# Patient Record
Sex: FEMALE | Race: WHITE | NOT HISPANIC OR LATINO
[De-identification: names, ages, dates, MRNs, and addresses within clinical notes are randomized per-mention and may not be internally consistent; named-entity substitution may affect disease eponyms.]

---

## 2018-07-13 ENCOUNTER — RESULT CHARGE (OUTPATIENT)
Age: 68
End: 2018-07-13

## 2018-07-13 ENCOUNTER — OTHER (OUTPATIENT)
Age: 68
End: 2018-07-13

## 2018-07-13 ENCOUNTER — APPOINTMENT (OUTPATIENT)
Dept: HEART AND VASCULAR | Facility: CLINIC | Age: 68
End: 2018-07-13
Payer: COMMERCIAL

## 2018-07-13 ENCOUNTER — APPOINTMENT (OUTPATIENT)
Dept: HEART AND VASCULAR | Facility: CLINIC | Age: 68
End: 2018-07-13

## 2018-07-13 ENCOUNTER — RECORD ABSTRACTING (OUTPATIENT)
Age: 68
End: 2018-07-13

## 2018-07-13 VITALS — DIASTOLIC BLOOD PRESSURE: 80 MMHG | HEART RATE: 78 BPM | SYSTOLIC BLOOD PRESSURE: 127 MMHG

## 2018-07-13 DIAGNOSIS — Z82.49 FAMILY HISTORY OF ISCHEMIC HEART DISEASE AND OTHER DISEASES OF THE CIRCULATORY SYSTEM: ICD-10-CM

## 2018-07-13 DIAGNOSIS — Z87.891 PERSONAL HISTORY OF NICOTINE DEPENDENCE: ICD-10-CM

## 2018-07-13 DIAGNOSIS — Z86.73 PERSONAL HISTORY OF TRANSIENT ISCHEMIC ATTACK (TIA), AND CEREBRAL INFARCTION W/OUT RESIDUAL DEFICITS: ICD-10-CM

## 2018-07-13 DIAGNOSIS — Z86.79 PERSONAL HISTORY OF OTHER DISEASES OF THE CIRCULATORY SYSTEM: ICD-10-CM

## 2018-07-13 PROCEDURE — 99213 OFFICE O/P EST LOW 20 MIN: CPT | Mod: 25

## 2018-07-13 PROCEDURE — 93880 EXTRACRANIAL BILAT STUDY: CPT

## 2018-07-13 PROCEDURE — 93000 ELECTROCARDIOGRAM COMPLETE: CPT

## 2018-07-13 PROCEDURE — 93306 TTE W/DOPPLER COMPLETE: CPT

## 2018-07-13 RX ORDER — FLUTICASONE PROPIONATE 50 MCG
50 SPRAY, SUSPENSION NASAL
Refills: 0 | Status: ACTIVE | COMMUNITY

## 2018-07-13 RX ORDER — FOLIC ACID 1 MG/1
1 TABLET ORAL
Refills: 0 | Status: ACTIVE | COMMUNITY

## 2018-07-13 RX ORDER — ASPIRIN 81 MG
81 TABLET, DELAYED RELEASE (ENTERIC COATED) ORAL
Refills: 0 | Status: ACTIVE | COMMUNITY

## 2022-10-04 ENCOUNTER — NON-APPOINTMENT (OUTPATIENT)
Age: 72
End: 2022-10-04

## 2022-10-04 ENCOUNTER — APPOINTMENT (OUTPATIENT)
Dept: HEART AND VASCULAR | Facility: CLINIC | Age: 72
End: 2022-10-04

## 2022-10-04 VITALS
HEART RATE: 48 BPM | WEIGHT: 180 LBS | DIASTOLIC BLOOD PRESSURE: 80 MMHG | SYSTOLIC BLOOD PRESSURE: 165 MMHG | HEIGHT: 64 IN | RESPIRATION RATE: 16 BRPM | BODY MASS INDEX: 30.73 KG/M2

## 2022-10-04 PROCEDURE — ZZZZZ: CPT

## 2022-10-04 PROCEDURE — 93880 EXTRACRANIAL BILAT STUDY: CPT

## 2022-10-04 PROCEDURE — 99204 OFFICE O/P NEW MOD 45 MIN: CPT | Mod: 25

## 2022-10-04 PROCEDURE — 93306 TTE W/DOPPLER COMPLETE: CPT

## 2022-10-04 PROCEDURE — 93000 ELECTROCARDIOGRAM COMPLETE: CPT

## 2022-10-04 RX ORDER — ASPIRIN 81 MG
81 TABLET, DELAYED RELEASE (ENTERIC COATED) ORAL DAILY
Refills: 0 | Status: ACTIVE | COMMUNITY

## 2022-10-04 RX ORDER — ALPRAZOLAM 0.25 MG/1
0.25 TABLET ORAL
Refills: 0 | Status: ACTIVE | COMMUNITY

## 2022-10-04 RX ORDER — LOSARTAN POTASSIUM 100 MG/1
100 TABLET, FILM COATED ORAL
Refills: 0 | Status: DISCONTINUED | COMMUNITY
End: 2022-10-04

## 2022-10-04 RX ORDER — SERTRALINE HYDROCHLORIDE 50 MG/1
50 TABLET, FILM COATED ORAL DAILY
Refills: 0 | Status: ACTIVE | COMMUNITY

## 2022-10-04 NOTE — HISTORY OF PRESENT ILLNESS
[FreeTextEntry1] : Patient is a 72-year-old former smoker with a history of hypertension and hyperlipidemia who had been seen some 4 years ago for baseline cardiac evaluation.  She currently is referred by her internist for progressive symptoms of dyspnea with exertion she finds she has trouble keeping up with walking with friends as well as when she walks individually.  Periods of shortness of breath and not associated with any chest pressure.  She has no accompanying diaphoresis nausea or vomiting.  Patient has reasonably good sleep pattern with no PND she has occasional ankle edema which she takes diuretic as needed..

## 2022-10-04 NOTE — DISCUSSION/SUMMARY
[Hypertension] : hypertension [Not Responding to Treatment] : not responding to treatment [Increase] : increasing calcium channel blockers [Dietary Modification] : dietary modification [Home Monitor] : a home monitor [Weight Loss] : weight loss [Patient] : the patient [Heart Failure Diastolic] : diastolic heart failure [Deteriorating] : deteriorating [Outpatient Evaluation] : outpatient evaluation [Echocardiogram] : an echocardiogram [Electrocardiogram] : an ECG [Treadmill Exercise Test] : a treadmill stress test [Continue] : continuing beta blockers [Low Sodium Diet] : low sodium diet [de-identified] : r/o CAD

## 2022-10-04 NOTE — PHYSICAL EXAM
[Well Developed] : well developed [Well Nourished] : well nourished [No Acute Distress] : no acute distress [Normal Conjunctiva] : normal conjunctiva [Normal Venous Pressure] : normal venous pressure [No Carotid Bruit] : no carotid bruit [No Gallop] : no gallop [5th Left ICS - MCL] : palpated at the 5th LICS in the midclavicular line [Normal] : normal [Rhythm Regular] : regular [Normal S1] : normal S1 [Normal S2] : normal S2 [II] : a grade 2 [Right Carotid Bruit] : right carotid bruit heard [Left Carotid Bruit] : left carotid bruit heard [Clear Lung Fields] : clear lung fields [Good Air Entry] : good air entry [No Respiratory Distress] : no respiratory distress  [Soft] : abdomen soft [Non Tender] : non-tender [No Masses/organomegaly] : no masses/organomegaly [Normal Bowel Sounds] : normal bowel sounds [Normal Gait] : normal gait [No Edema] : no edema [No Cyanosis] : no cyanosis [No Clubbing] : no clubbing [No Varicosities] : no varicosities [No Rash] : no rash [No Skin Lesions] : no skin lesions [Moves all extremities] : moves all extremities [No Focal Deficits] : no focal deficits [Normal Speech] : normal speech [Alert and Oriented] : alert and oriented [Normal memory] : normal memory

## 2022-10-04 NOTE — REVIEW OF SYSTEMS
[Feeling Fatigued] : feeling fatigued [SOB] : shortness of breath [Dyspnea on exertion] : dyspnea during exertion [Lower Ext Edema] : lower extremity edema [Negative] : Integumentary [Headache] : no headache [Weight Gain (___ Lbs)] : no recent weight gain [Weight Loss (___ Lbs)] : no recent weight loss [Blurry Vision] : no blurred vision [Seeing Double (Diplopia)] : no diplopia [Chest Discomfort] : no chest discomfort [Palpitations] : no palpitations [Orthopnea] : no orthopnea [PND] : no PND

## 2022-10-04 NOTE — ASSESSMENT
[FreeTextEntry1] : Echo Lvef 55% MOD Diast Dysfx and 3+ MR Post Jet \par Carotid Mod non obstructive plaque (progressive)\par Etiology of dyspnea can be multifactorial \par 1 Diastolic dysfx \par 2 Progressive MR \par 3 Possible ischemic disease \par will increase Norvasc to 10 mg for Bp control \par schedule for nuclear stress imaging \par Consider adding aldactone if bP not well controlled

## 2022-10-12 ENCOUNTER — APPOINTMENT (OUTPATIENT)
Dept: HEART AND VASCULAR | Facility: CLINIC | Age: 72
End: 2022-10-12

## 2022-10-12 DIAGNOSIS — I70.90 UNSPECIFIED ATHEROSCLEROSIS: ICD-10-CM

## 2022-10-12 PROCEDURE — A9500: CPT

## 2022-10-12 PROCEDURE — 93015 CV STRESS TEST SUPVJ I&R: CPT

## 2022-10-12 PROCEDURE — 96374 THER/PROPH/DIAG INJ IV PUSH: CPT | Mod: 59

## 2022-10-12 PROCEDURE — 78452 HT MUSCLE IMAGE SPECT MULT: CPT

## 2022-10-26 ENCOUNTER — APPOINTMENT (OUTPATIENT)
Dept: HEART AND VASCULAR | Facility: CLINIC | Age: 72
End: 2022-10-26

## 2022-10-26 VITALS
HEIGHT: 64 IN | WEIGHT: 180 LBS | BODY MASS INDEX: 30.73 KG/M2 | SYSTOLIC BLOOD PRESSURE: 160 MMHG | RESPIRATION RATE: 16 BRPM | DIASTOLIC BLOOD PRESSURE: 85 MMHG

## 2022-10-26 PROCEDURE — 99214 OFFICE O/P EST MOD 30 MIN: CPT

## 2022-10-27 NOTE — HISTORY OF PRESENT ILLNESS
[FreeTextEntry1] : recent ETT neg for ischemia \par Echo Mod-severe MR w PHtn \par good effort capacity

## 2022-10-27 NOTE — ASSESSMENT
[FreeTextEntry1] : Negative for ischemia on stress test \par Good effort caapacity \par Has Progressive MR on echo compared to 2017\par No indication for intervention now \par need to control BP < 138/80 ,\par regular exercise and wgt loss\par will f/u 6m

## 2022-10-27 NOTE — DISCUSSION/SUMMARY
[FreeTextEntry1] : Negative for ischemia on stress test  Good effort caapacity  Has Progressive MR on echo compared to 2017 No indication for intervention now  need to control BP < 138/80 , regular exercise and wgt loss will f/u 6m

## 2022-10-27 NOTE — REVIEW OF SYSTEMS
[Fever] : no fever [Chills] : no chills [SOB] : shortness of breath [Leg Claudication] : no intermittent leg claudication [Syncope] : no syncope [Negative] : Gastrointestinal

## 2022-11-09 PROBLEM — I70.90 UNSPECIFIED ATHEROSCLEROSIS: Status: ACTIVE | Noted: 2018-07-13

## 2023-04-26 ENCOUNTER — APPOINTMENT (OUTPATIENT)
Dept: HEART AND VASCULAR | Facility: CLINIC | Age: 73
End: 2023-04-26

## 2023-09-01 ENCOUNTER — APPOINTMENT (OUTPATIENT)
Dept: HEART AND VASCULAR | Facility: CLINIC | Age: 73
End: 2023-09-01
Payer: COMMERCIAL

## 2023-09-01 ENCOUNTER — NON-APPOINTMENT (OUTPATIENT)
Age: 73
End: 2023-09-01

## 2023-09-01 VITALS — DIASTOLIC BLOOD PRESSURE: 88 MMHG | SYSTOLIC BLOOD PRESSURE: 148 MMHG

## 2023-09-01 VITALS
WEIGHT: 184 LBS | DIASTOLIC BLOOD PRESSURE: 80 MMHG | HEART RATE: 50 BPM | SYSTOLIC BLOOD PRESSURE: 142 MMHG | HEIGHT: 64 IN | BODY MASS INDEX: 31.41 KG/M2

## 2023-09-01 PROCEDURE — 99214 OFFICE O/P EST MOD 30 MIN: CPT | Mod: 25

## 2023-09-01 PROCEDURE — 93000 ELECTROCARDIOGRAM COMPLETE: CPT

## 2023-09-01 NOTE — PHYSICAL EXAM
[Well Developed] : well developed [Well Nourished] : well nourished [No Acute Distress] : no acute distress [Normal Conjunctiva] : normal conjunctiva [Normal Venous Pressure] : normal venous pressure [No Carotid Bruit] : no carotid bruit [Normal S1, S2] : normal S1, S2 [No Murmur] : no murmur [No Rub] : no rub [No Gallop] : no gallop [Clear Lung Fields] : clear lung fields [Good Air Entry] : good air entry [No Respiratory Distress] : no respiratory distress  [Soft] : abdomen soft [Non Tender] : non-tender [No Masses/organomegaly] : no masses/organomegaly [Normal Bowel Sounds] : normal bowel sounds

## 2023-09-01 NOTE — HISTORY OF PRESENT ILLNESS
[FreeTextEntry1] : 10/26/22 recent ETT neg for ischemia Echo Mod-severe MR w PHtn good effort capacity 09/01/23 had swelling in LE with norvaswc 10 mg  was educed to Norvasc 5mg and 2.5 mg prn elevated BP  edema resolved  feels well no sscp or [facility]os

## 2023-09-01 NOTE — ASSESSMENT
[FreeTextEntry1] : Negative for ischemia on stress test Good effort capacity Has Progressive MR on echo compared to 2017 No indication for intervention now need to control BP < 138/80 , regular exercise and wgt loss will f/u 6m.

## 2023-09-01 NOTE — DISCUSSION/SUMMARY
[FreeTextEntry1] : Negative for ischemia on stress test Good effort capacity Has Progressive MR on echo compared to 2017 f/u echo in oct 2023  No indication for intervention now need to control BP < 138/80 , regular exercise and wgt loss will f/u 6m.

## 2023-09-01 NOTE — REVIEW OF SYSTEMS
[SOB] : shortness of breath [Negative] : Gastrointestinal [Fever] : no fever [Chills] : no chills [Leg Claudication] : no intermittent leg claudication [Syncope] : no syncope

## 2023-09-01 NOTE — ADDENDUM
[FreeTextEntry1] : I, Armando Blankenship, assisted in documentation on 09/01/2023 acting as a scribe for Dr. Emmett Matt.

## 2023-10-26 ENCOUNTER — NON-APPOINTMENT (OUTPATIENT)
Age: 73
End: 2023-10-26

## 2023-10-27 ENCOUNTER — APPOINTMENT (OUTPATIENT)
Dept: HEART AND VASCULAR | Facility: CLINIC | Age: 73
End: 2023-10-27
Payer: COMMERCIAL

## 2023-10-27 ENCOUNTER — NON-APPOINTMENT (OUTPATIENT)
Age: 73
End: 2023-10-27

## 2023-10-27 VITALS
BODY MASS INDEX: 31.41 KG/M2 | HEART RATE: 51 BPM | SYSTOLIC BLOOD PRESSURE: 140 MMHG | DIASTOLIC BLOOD PRESSURE: 90 MMHG | WEIGHT: 184 LBS | HEIGHT: 64 IN

## 2023-10-27 DIAGNOSIS — I67.89 OTHER CEREBROVASCULAR DISEASE: ICD-10-CM

## 2023-10-27 PROCEDURE — 93880 EXTRACRANIAL BILAT STUDY: CPT

## 2023-10-27 PROCEDURE — ZZZZZ: CPT

## 2023-10-27 PROCEDURE — 99214 OFFICE O/P EST MOD 30 MIN: CPT | Mod: 25

## 2023-10-27 PROCEDURE — 93000 ELECTROCARDIOGRAM COMPLETE: CPT

## 2023-10-27 PROCEDURE — 93306 TTE W/DOPPLER COMPLETE: CPT

## 2023-10-27 RX ORDER — SERTRALINE 25 MG/1
25 TABLET, FILM COATED ORAL DAILY
Refills: 0 | Status: ACTIVE | COMMUNITY

## 2023-10-27 RX ORDER — AMLODIPINE BESYLATE 2.5 MG/1
2.5 TABLET ORAL
Qty: 90 | Refills: 3 | Status: DISCONTINUED | COMMUNITY
Start: 2023-09-01 | End: 2023-10-27

## 2023-10-29 ENCOUNTER — NON-APPOINTMENT (OUTPATIENT)
Age: 73
End: 2023-10-29

## 2023-12-12 ENCOUNTER — NON-APPOINTMENT (OUTPATIENT)
Age: 73
End: 2023-12-12

## 2024-04-19 ENCOUNTER — APPOINTMENT (OUTPATIENT)
Dept: HEART AND VASCULAR | Facility: CLINIC | Age: 74
End: 2024-04-19
Payer: COMMERCIAL

## 2024-04-19 VITALS
DIASTOLIC BLOOD PRESSURE: 78 MMHG | HEIGHT: 64 IN | WEIGHT: 184 LBS | HEART RATE: 51 BPM | SYSTOLIC BLOOD PRESSURE: 140 MMHG | BODY MASS INDEX: 31.41 KG/M2

## 2024-04-19 DIAGNOSIS — I34.0 NONRHEUMATIC MITRAL (VALVE) INSUFFICIENCY: ICD-10-CM

## 2024-04-19 DIAGNOSIS — I10 ESSENTIAL (PRIMARY) HYPERTENSION: ICD-10-CM

## 2024-04-19 DIAGNOSIS — I87.2 VENOUS INSUFFICIENCY (CHRONIC) (PERIPHERAL): ICD-10-CM

## 2024-04-19 DIAGNOSIS — Z00.00 ENCOUNTER FOR GENERAL ADULT MEDICAL EXAMINATION W/OUT ABNORMAL FINDINGS: ICD-10-CM

## 2024-04-19 PROCEDURE — 93000 ELECTROCARDIOGRAM COMPLETE: CPT

## 2024-04-19 PROCEDURE — G2211 COMPLEX E/M VISIT ADD ON: CPT

## 2024-04-19 PROCEDURE — 99214 OFFICE O/P EST MOD 30 MIN: CPT

## 2024-04-19 RX ORDER — FUROSEMIDE 20 MG/1
20 TABLET ORAL
Refills: 0 | Status: ACTIVE | COMMUNITY

## 2024-04-19 RX ORDER — LOSARTAN POTASSIUM AND HYDROCHLOROTHIAZIDE 12.5; 1 MG/1; MG/1
100-12.5 TABLET ORAL DAILY
Refills: 0 | Status: ACTIVE | COMMUNITY

## 2024-04-19 RX ORDER — ROSUVASTATIN CALCIUM 20 MG/1
20 TABLET, FILM COATED ORAL
Refills: 0 | Status: ACTIVE | COMMUNITY

## 2024-04-19 RX ORDER — AMLODIPINE BESYLATE 5 MG/1
5 TABLET ORAL DAILY
Qty: 30 | Refills: 5 | Status: ACTIVE | COMMUNITY

## 2024-04-19 RX ORDER — ATENOLOL 100 MG/1
100 TABLET ORAL
Refills: 0 | Status: ACTIVE | COMMUNITY

## 2024-05-01 ENCOUNTER — APPOINTMENT (OUTPATIENT)
Dept: HEART AND VASCULAR | Facility: CLINIC | Age: 74
End: 2024-05-01
Payer: COMMERCIAL

## 2024-05-01 PROCEDURE — 93970 EXTREMITY STUDY: CPT

## 2024-11-08 ENCOUNTER — NON-APPOINTMENT (OUTPATIENT)
Age: 74
End: 2024-11-08

## 2024-11-08 ENCOUNTER — APPOINTMENT (OUTPATIENT)
Dept: HEART AND VASCULAR | Facility: CLINIC | Age: 74
End: 2024-11-08
Payer: MEDICARE

## 2024-11-08 VITALS
DIASTOLIC BLOOD PRESSURE: 84 MMHG | HEIGHT: 64 IN | WEIGHT: 188 LBS | SYSTOLIC BLOOD PRESSURE: 144 MMHG | HEART RATE: 47 BPM | BODY MASS INDEX: 32.1 KG/M2

## 2024-11-08 DIAGNOSIS — Z00.00 ENCOUNTER FOR GENERAL ADULT MEDICAL EXAMINATION W/OUT ABNORMAL FINDINGS: ICD-10-CM

## 2024-11-08 DIAGNOSIS — I34.0 NONRHEUMATIC MITRAL (VALVE) INSUFFICIENCY: ICD-10-CM

## 2024-11-08 PROCEDURE — 93306 TTE W/DOPPLER COMPLETE: CPT

## 2024-11-08 PROCEDURE — 93000 ELECTROCARDIOGRAM COMPLETE: CPT

## 2024-11-08 PROCEDURE — G2211 COMPLEX E/M VISIT ADD ON: CPT

## 2024-11-08 PROCEDURE — 99215 OFFICE O/P EST HI 40 MIN: CPT

## 2024-11-08 NOTE — DISCUSSION/SUMMARY
[Moderate Mitral Regurgitation] : moderate mitral regurgitation [Compensated] : compensated [Echocardiogram] : echocardiogram [Continue] : continuing calcium channel blockers [FreeTextEntry1] : Progressive MR on echo w dilated LA and PULM HTN  will refer for [facility] ? clip vs MV repair  options and risks discussed  [EKG obtained to assist in diagnosis and management of assessed problem(s)] : EKG obtained to assist in diagnosis and management of assessed problem(s)

## 2024-11-08 NOTE — ADDENDUM
[FreeTextEntry1] : Ronnie Garcia assisted in documentation on Nov 8 2024 acting as a scribe for Dr. Mauro Hercules.

## 2024-11-08 NOTE — ASSESSMENT
[FreeTextEntry1] : Lvef 55 3-4 mr DILATED al AND PULM HTN  Refer to [facility] for clip procedure   Lifestyle changes for control of BP reviewed ie wgt reduction, salt restriction, Etoh avoidance, exercise 30 min aerobic activity per day, avoid NSAID use self monitor BP TIW Lifestyle advice for LDL reduction including reduction of red meat consumption concentrating on a plant based diet. 30 min aerobic exercise per day. Calorie reduction to target BMI<25

## 2024-11-08 NOTE — HISTORY OF PRESENT ILLNESS
[FreeTextEntry1] : 10/26/22 recent ETT neg for ischemia Echo Mod-severe MR w PHtn good effort capacity 09/01/23 had swelling in LE with norvaswc 10 mg  was educed to Norvasc 5mg and 2.5 mg prn elevated BP  edema resolved  feels well no sscp or [place]  10/27/23  Has 3+ MR on echo  leg edema usually when standing uses lasis prn  effort capacity unchanged  compliant w BP meds  4/19/24 prior MR tolerating stairs   11/08/24 seen here for HTN and MR moderate stress taking care of 89 yo mother

## 2024-11-08 NOTE — PHYSICAL EXAM
[Well Developed] : well developed [Well Nourished] : well nourished [No Acute Distress] : no acute distress [Normal Conjunctiva] : normal conjunctiva [Normal Venous Pressure] : normal venous pressure [No Carotid Bruit] : no carotid bruit [Carotid Bruit] : carotid bruit [Normal] : normal [Normal S1] : normal S1 [Normal S2] : normal S2 [III] : a grade 3 [___ +] : bilateral [unfilled]U+ pitting edema to the ankles [Clear Lung Fields] : clear lung fields [Good Air Entry] : good air entry [No Respiratory Distress] : no respiratory distress  [Soft] : abdomen soft [Non Tender] : non-tender [No Masses/organomegaly] : no masses/organomegaly [Normal Bowel Sounds] : normal bowel sounds

## 2024-12-16 ENCOUNTER — NON-APPOINTMENT (OUTPATIENT)
Age: 74
End: 2024-12-16

## 2024-12-16 ENCOUNTER — APPOINTMENT (OUTPATIENT)
Dept: CT IMAGING | Facility: HOSPITAL | Age: 74
End: 2024-12-16

## 2024-12-16 ENCOUNTER — APPOINTMENT (OUTPATIENT)
Dept: CARDIOTHORACIC SURGERY | Facility: CLINIC | Age: 74
End: 2024-12-16
Payer: MEDICARE

## 2024-12-16 ENCOUNTER — RESULT REVIEW (OUTPATIENT)
Age: 74
End: 2024-12-16

## 2024-12-16 ENCOUNTER — OUTPATIENT (OUTPATIENT)
Dept: OUTPATIENT SERVICES | Facility: HOSPITAL | Age: 74
LOS: 1 days | End: 2024-12-16
Payer: MEDICARE

## 2024-12-16 VITALS
TEMPERATURE: 97.4 F | HEART RATE: 51 BPM | WEIGHT: 190 LBS | HEIGHT: 64 IN | DIASTOLIC BLOOD PRESSURE: 71 MMHG | OXYGEN SATURATION: 98 % | SYSTOLIC BLOOD PRESSURE: 161 MMHG | BODY MASS INDEX: 32.44 KG/M2

## 2024-12-16 DIAGNOSIS — Z82.49 FAMILY HISTORY OF ISCHEMIC HEART DISEASE AND OTHER DISEASES OF THE CIRCULATORY SYSTEM: ICD-10-CM

## 2024-12-16 DIAGNOSIS — I34.0 NONRHEUMATIC MITRAL (VALVE) INSUFFICIENCY: ICD-10-CM

## 2024-12-16 DIAGNOSIS — Z86.73 PERSONAL HISTORY OF TRANSIENT ISCHEMIC ATTACK (TIA), AND CEREBRAL INFARCTION W/OUT RESIDUAL DEFICITS: ICD-10-CM

## 2024-12-16 DIAGNOSIS — Z63.5 DISRUPTION OF FAMILY BY SEPARATION AND DIVORCE: ICD-10-CM

## 2024-12-16 PROCEDURE — 93312 ECHO TRANSESOPHAGEAL: CPT

## 2024-12-16 PROCEDURE — 86900 BLOOD TYPING SEROLOGIC ABO: CPT

## 2024-12-16 PROCEDURE — 36415 COLL VENOUS BLD VENIPUNCTURE: CPT

## 2024-12-16 PROCEDURE — 85610 PROTHROMBIN TIME: CPT

## 2024-12-16 PROCEDURE — 80053 COMPREHEN METABOLIC PANEL: CPT

## 2024-12-16 PROCEDURE — 93312 ECHO TRANSESOPHAGEAL: CPT | Mod: 26

## 2024-12-16 PROCEDURE — 99204 OFFICE O/P NEW MOD 45 MIN: CPT

## 2024-12-16 PROCEDURE — 85730 THROMBOPLASTIN TIME PARTIAL: CPT

## 2024-12-16 PROCEDURE — 86850 RBC ANTIBODY SCREEN: CPT

## 2024-12-16 PROCEDURE — 83880 ASSAY OF NATRIURETIC PEPTIDE: CPT

## 2024-12-16 PROCEDURE — 85025 COMPLETE CBC W/AUTO DIFF WBC: CPT

## 2024-12-16 PROCEDURE — 86901 BLOOD TYPING SEROLOGIC RH(D): CPT

## 2024-12-16 PROCEDURE — 76377 3D RENDER W/INTRP POSTPROCES: CPT | Mod: 26

## 2024-12-16 RX ORDER — LOSARTAN POTASSIUM 100 MG/1
100 TABLET, FILM COATED ORAL DAILY
Refills: 0 | Status: ACTIVE | COMMUNITY

## 2024-12-16 SDOH — SOCIAL STABILITY - SOCIAL INSECURITY: DISRUPTION OF FAMILY BY SEPARATION AND DIVORCE: Z63.5

## 2024-12-17 NOTE — ASSESSMENT
[FreeTextEntry1] : 74F, former smoker, with PMH of CVA (7/2012, treated at [facility], no residual), psoriasis, OA, renal mass thought to be a normal variant (saw Dr. Christy Mckeon at [facility]), HTN, HLD, and moderate to severe MR referred for consultation of treatment options for MR. Dr. Herrera has independently seen and evaluated the patient in this face-to-face encounter. Patient is clinically stable, NYHA Class II. Echocardiogram showed normal BiV function, 3+MR- atrial functional, 3+TR, normal LV size and function, and moderate PHTN. Per Dr. Goldberg's AARON review, patient with moderate atrial functional MR (final read pending). Results were discussed with patient and her daughter. Indications to treat MR were discussed. Given, patient does not have severe MR, Dr. Herrera recommends close clinical monitoring at this time. Patient advised to reach out to our [facility] should she develop worsening HF symptoms, which will require her to be evaluate sooner rather than later. The patient will otherwise return to [facility] clinic in 6 months with a TTE. All questions were addressed.

## 2024-12-17 NOTE — RESULTS/DATA
[TextEntry] : TTE 11/8/24 CONCLUSIONS: 1. Left ventricular systolic function is normal with an ejection fraction visually estimated at 55 to 60 %. 2. Normal right ventricular cavity size, with normal wall thickness, and normal right ventricular systolic function. 3. Left atrium is severely dilated. 4. Moderate to severe mitral regurgitation. The mitral regurgitant jet is posteriorly directed. 5. There has been progression of Pulmonary hypertension and progression of MITRAL REGURGITATION. 6. Mild left ventricular hypertrophy. 7. Moderate pulmonary hypertension. 8. Moderate tricuspid regurgitation. 9. The inferior vena cava is normal in size (normal <2.1cm) with normal inspiratory collapse (normal >50%) consistent with normal right atrial pressure ( R 3, range 0-5mmHg).  12/16/24 WBC 5.95 H/H 11.9/36.8 Plt 193 BUN/Cr 15/0.79 ProBNP 1101

## 2024-12-17 NOTE — ASSESSMENT
[FreeTextEntry1] : 74F, former smoker, with PMH of CVA (7/2012, treated at [facility], no residual), psoriasis, OA, renal mass thought to be a normal variant (saw Dr. Christy Mckeon at [facility]), HTN, HLD, and moderate to severe MR referred for consultation of treatment options for MR. Dr. Herrera has independently seen and evaluated the patient in this face-to-face encounter. Patient is clinically stable, NYHA Class II. Echocardiogram showed normal BiV function, 3+MR- atrial functional, 3+TR, normal LV size and function, and moderate PHTN. Per Dr. Goldberg's AARON review, patient with moderate atrial functional MR (final read pending). Results were discussed with patient and her daughter. Indications to treat MR were discussed. Given, patient does not have severe MR, Dr. Herrera recommends close clinical monitoring at this time. Patient advised to reach out to our [facility] should she develop worsening HF symptoms, which will require her to be evaluate sooner rather than later. The patient will otherwise return to [facility] clinic in 6 months with a TTE. All questions were addressed.

## 2024-12-17 NOTE — REVIEW OF SYSTEMS
[Joint Pain] : joint pain [Under Stress] : under stress [Negative] : Heme/Lymph [Confusion] : no confusion was observed [FreeTextEntry5] : see HPI

## 2024-12-17 NOTE — PLAN
[TextEntry] : - continue GDMT per Dr. Hercules.  - return to D clinic in 6 months with TTE and OV with Dr. Herrera.   I, Dr. Eleazar Herrera, personally performed the evaluation and management (E/M) services for this new patient. That E/M includes conducting the clinically appropriate initial history &/or exam, assessing all conditions, and establishing the plan of care. Today, my SELAM, noted herewith, was here to observe my evaluation and management service for this patient & follow plan of care established by me going forward.

## 2024-12-17 NOTE — HISTORY OF PRESENT ILLNESS
[FreeTextEntry1] : Referred by Dr. Hercules  74F, former smoker, with PMH of CVA (7/2012, treated at [facility], no residual), psoriasis, OA, renal mass thought to be a normal variant ([facility]), HTN, HLD, and moderate to severe MR referred for consultation of treatment options for MR.   Denies prior MI/CAD, pulm/liver/heme disease. No prior transfusion.   Per Dr. Mckeon's 11/20/24 note:  Renal Mass:  - MRI 12/2022 with kidney with lobular contour which is a normal variant and no mass. Right kidney with simple cysts; although it has grown in size it is   simple and therefore per guidelines does not require further f/u given very low malignancy potential. Possible left renal cyst noted on MRI 12/2022 not seen on   US 11/2024 so would not continue surveillance imaging there.  TTE 11/18/24 CONCLUSIONS: 1. Left ventricular systolic function is normal with an ejection fraction visually estimated at 55 to 60 %. 2. Normal right ventricular cavity size, with normal wall thickness, and normal right ventricular systolic function. 3. Left atrium is severely dilated. 4. Moderate to severe mitral regurgitation. The mitral regurgitant jet is posteriorly directed. 5. There has been progression of Pulmonary hypertension and progression of MITRAL REGURGITATION. 6. Mild left ventricular hypertrophy. 7. Moderate pulmonary hypertension. 8. Moderate tricuspid regurgitation. 9. The inferior vena cava is normal in size (normal <2.1cm) with normal inspiratory collapse (normal >50%) consistent with normal right atrial pressure (R 3, range 0-5mmHg).  EKG 11/8/24: SB rate 47, , QRS 94, QTc 412, normal axis.   Today, patient reports feeling ok. Energy is good. Exercise tolerance of 2 blocks limited by shortness of breath, able to go up and down her two flights of stairs in her house without stopping but get some dyspnea in the process. Occasional LE edema, last were months ago, which was thought to be due to higher dose of amlodipine. LE edema is better since her dose was decreased to 7.5mg from 10mg.  Denies any recent chest pain, palpitations, lightheadedness/dizziness or syncope, orthopnea, PND, abdominal bloating, fever, chills, night sweats, dysuria, active dental issue, or recent hospitalizations. Patient lives alone, retired , independent with ADLs and iADLs.

## 2024-12-17 NOTE — PHYSICAL EXAM
[Well Developed] : well developed [Well Nourished] : well nourished [No Acute Distress] : no acute distress [Normal Conjunctiva] : normal conjunctiva [Normal S1, S2] : normal S1, S2 [No Rub] : no rub [No Gallop] : no gallop [Murmur] : murmur [Clear Lung Fields] : clear lung fields [Good Air Entry] : good air entry [No Respiratory Distress] : no respiratory distress  [Soft] : abdomen soft [Non Tender] : non-tender [Normal Bowel Sounds] : normal bowel sounds [Normal Gait] : normal gait [No Edema] : no edema [No Cyanosis] : no cyanosis [No Clubbing] : no clubbing [No Rash] : no rash [No Skin Lesions] : no skin lesions [de-identified] : supple  [de-identified] : II/VI SM

## 2024-12-17 NOTE — PHYSICAL EXAM
[Well Developed] : well developed [Well Nourished] : well nourished [No Acute Distress] : no acute distress [Normal Conjunctiva] : normal conjunctiva [Normal S1, S2] : normal S1, S2 [No Rub] : no rub [No Gallop] : no gallop [Murmur] : murmur [Clear Lung Fields] : clear lung fields [Good Air Entry] : good air entry [No Respiratory Distress] : no respiratory distress  [Soft] : abdomen soft [Non Tender] : non-tender [Normal Bowel Sounds] : normal bowel sounds [Normal Gait] : normal gait [No Edema] : no edema [No Cyanosis] : no cyanosis [No Clubbing] : no clubbing [No Rash] : no rash [No Skin Lesions] : no skin lesions [de-identified] : supple  [de-identified] : II/VI SM

## 2024-12-17 NOTE — PHYSICAL EXAM
[Well Developed] : well developed [Well Nourished] : well nourished [No Acute Distress] : no acute distress [Normal Conjunctiva] : normal conjunctiva [Normal S1, S2] : normal S1, S2 [No Rub] : no rub [No Gallop] : no gallop [Murmur] : murmur [Clear Lung Fields] : clear lung fields [Good Air Entry] : good air entry [No Respiratory Distress] : no respiratory distress  [Soft] : abdomen soft [Non Tender] : non-tender [Normal Bowel Sounds] : normal bowel sounds [Normal Gait] : normal gait [No Edema] : no edema [No Cyanosis] : no cyanosis [No Clubbing] : no clubbing [No Rash] : no rash [No Skin Lesions] : no skin lesions [de-identified] : supple  [de-identified] : II/VI SM

## 2024-12-17 NOTE — ASSESSMENT
[FreeTextEntry1] : 74F, former smoker, with PMH of CVA (7/2012, treated at [facility], no residual), psoriasis, OA, renal mass thought to be a normal variant (saw Dr. Christy Mckeon at [facility]), HTN, HLD, and moderate to severe MR referred for consultation of treatment options for MR. Dr. Herrera has independently seen and evaluated the patient in this face-to-face encounter. Patient is clinically stable, NYHA Class II. Echocardiogram showed normal BiV function, 3+MR- atrial functional, 3+TR, normal LV size and function, and moderate PHTN. Per Dr. Goldberg's AARON review, patient with moderate atrial functional MR (final read pending). Results were discussed with patient and her daughter. Indications to treat MR were discussed. Given, patient does not have severe MR, Dr. Herrera recommends close clinical monitoring at this time. Patient advised to reach out to our [facility] should she develop worsening HF symptoms, which will require her to be evaluate sooner rather than later. The patient will otherwise return to [facility] clinic in 6 months with a TTE. All questions were addressed.

## 2024-12-17 NOTE — HISTORY OF PRESENT ILLNESS
[FreeTextEntry1] : Referred by Dr. Hercules  74F, former smoker, with PMH of CVA (7/2012, treated at [facility], no residual), psoriasis, OA, renal mass thought to be a normal variant (saw Dr. Christy Mckeon at [facility]), HTN, HLD, and moderate to severe MR referred for consultation of treatment options for MR.   Denies prior MI/CAD, pulm/liver/heme disease. No prior transfusion.   Per Dr. Mckeon's 11/20/24 note:  Renal Mass:  - MRI 12/2022 with kidney with lobular contour which is a normal variant and no mass. Right kidney with simple cysts; although it has grown in size it is   simple and therefore per guidelines does not require further f/u given very low malignancy potential. Possible left renal cyst noted on MRI 12/2022 not seen on   US 11/2024 so would not continue surveillance imaging there.  TTE 11/18/24 CONCLUSIONS: 1. Left ventricular systolic function is normal with an ejection fraction visually estimated at 55 to 60 %. 2. Normal right ventricular cavity size, with normal wall thickness, and normal right ventricular systolic function. 3. Left atrium is severely dilated. 4. Moderate to severe mitral regurgitation. The mitral regurgitant jet is posteriorly directed. 5. There has been progression of Pulmonary hypertension and progression of MITRAL REGURGITATION. 6. Mild left ventricular hypertrophy. 7. Moderate pulmonary hypertension. 8. Moderate tricuspid regurgitation. 9. The inferior vena cava is normal in size (normal <2.1cm) with normal inspiratory collapse (normal >50%) consistent with normal right atrial pressure (R 3, range 0-5mmHg).  EKG 11/8/24: SB rate 47, , QRS 94, QTc 412, normal axis.   Today, patient reports feeling ok. Energy is good. Exercise tolerance of 2 blocks limited by shortness of breath, able to go up and down her two flights of stairs in her house without stopping but get some dyspnea in the process. Occasional LE edema, last were months ago, which was thought to be due to higher dose of amlodipine. LE edema is better since her dose was decreased to 7.5mg from 10mg.  Denies any recent chest pain, palpitations, lightheadedness/dizziness or syncope, orthopnea, PND, abdominal bloating, fever, chills, night sweats, dysuria, active dental issue, or recent hospitalizations. Patient lives alone, retired , independent with ADLs and iADLs.

## 2024-12-17 NOTE — HISTORY OF PRESENT ILLNESS
[FreeTextEntry1] : Referred by Dr. Hercules  74F, former smoker, with PMH of CVA (7/2012, treated at [facility], no residual), psoriasis, OA, renal mass thought to be a normal variant (saw Dr. Christy Mckeon at [facility]), HTN, HLD, and moderate to severe MR referred for consultation of treatment options for MR.   Denies prior MI/CAD, pulm/liver/heme disease. No prior transfusion.   Per Dr. Mckeon's 11/20/24 note:  Renal Mass:  - MRI 12/2022 with kidney with lobular contour which is a normal variant and no mass. Right kidney with simple cysts; although it has grown in size it is   simple and therefore per guidelines does not require further f/u given very low malignancy potential. Possible left renal cyst noted on MRI 12/2022 not seen on   US 11/2024 so would not continue surveillance imaging there.  TTE 11/18/24 CONCLUSIONS: 1. Left ventricular systolic function is normal with an ejection fraction visually estimated at 55 to 60 %. 2. Normal right ventricular cavity size, with normal wall thickness, and normal right ventricular systolic function. 3. Left atrium is severely dilated. 4. Moderate to severe mitral regurgitation. The mitral regurgitant jet is posteriorly directed. 5. There has been progression of Pulmonary hypertension and progression of MITRAL REGURGITATION. 6. Mild left ventricular hypertrophy. 7. Moderate pulmonary hypertension. 8. Moderate tricuspid regurgitation. 9. The inferior vena cava is normal in size (normal <2.1cm) with normal inspiratory collapse (normal >50%) consistent with normal right atrial pressure (R 3, range 0-5mmHg).  EKG 11/8/24: SB rate 47, , QRS 94, QTc 412, normal axis.   Today, patient reports feeling ok. Energy is good. Exercise tolerance of 2 blocks limited by shortness of breath, able to go up and down her two flights of stairs in her house without stopping but get some dyspnea in the process. Occasional LE edema, last were months ago, which was thought to be due to higher dose of amlodipine. LE edema is better since her dose was decreased to 7.5mg from 10mg.  Denies any recent chest pain, palpitations, lightheadedness/dizziness or syncope, orthopnea, PND, abdominal bloating, fever, chills, night sweats, dysuria, active dental issue, or recent hospitalizations. Patient lives alone, retired , independent with ADLs and iADLs.

## 2025-01-03 ENCOUNTER — APPOINTMENT (OUTPATIENT)
Dept: MRI IMAGING | Facility: CLINIC | Age: 75
End: 2025-01-03

## 2025-01-03 ENCOUNTER — APPOINTMENT (OUTPATIENT)
Dept: ORTHOPEDIC SURGERY | Facility: CLINIC | Age: 75
End: 2025-01-03
Payer: MEDICARE

## 2025-01-03 DIAGNOSIS — S52.572A OTHER INTRAARTICULAR FRACTURE OF LOWER END OF LEFT RADIUS, INITIAL ENCOUNTER FOR CLOSED FRACTURE: ICD-10-CM

## 2025-01-03 DIAGNOSIS — S69.92XA UNSPECIFIED INJURY OF LEFT WRIST, HAND AND FINGER(S), INITIAL ENCOUNTER: ICD-10-CM

## 2025-01-03 DIAGNOSIS — S59.912A UNSPECIFIED INJURY OF LEFT FOREARM, INITIAL ENCOUNTER: ICD-10-CM

## 2025-01-03 DIAGNOSIS — S59.909A UNSPECIFIED INJURY OF UNSPECIFIED ELBOW, INITIAL ENCOUNTER: ICD-10-CM

## 2025-01-03 PROCEDURE — 29075 APPL CST ELBW FNGR SHORT ARM: CPT | Mod: LT

## 2025-01-03 PROCEDURE — 99203 OFFICE O/P NEW LOW 30 MIN: CPT | Mod: 25

## 2025-01-03 PROCEDURE — 73090 X-RAY EXAM OF FOREARM: CPT | Mod: LT

## 2025-01-03 PROCEDURE — 73080 X-RAY EXAM OF ELBOW: CPT | Mod: LT

## 2025-01-03 PROCEDURE — 73110 X-RAY EXAM OF WRIST: CPT | Mod: LT,76

## 2025-01-03 PROCEDURE — 73221 MRI JOINT UPR EXTREM W/O DYE: CPT | Mod: LT

## 2025-01-03 NOTE — HISTORY OF PRESENT ILLNESS
[de-identified] : The patient is a 74-year-old female right-hand-dominant here for evaluation of her left elbow, left wrist and left forearm.  She fell 1 day ago while carrying a bag into a home, the bed bumped her leg and she lost her balance and fell, she braced her fall with her left hand.  She immediately developed pain and swelling in the left wrist.  She also reports pain in the left forearm, close to her elbow.  She presents today in a carpal tunnel brace that her daughter put on for her.

## 2025-01-03 NOTE — DISCUSSION/SUMMARY
[de-identified] : I reviewed the x-ray findings with the patient.  Regarding her left elbow she was placed into a sling.  She was advised to remove the sling in 48 hours and start gentle range of motion with flexion, extension, supination and pronation.  I showed her how to do that here in the office today.  She will obtain an MRI of the left elbow to evaluate for an occult fracture since she has a posterior fat pad on exam.  She will call us 3 to 4 days after the MRI is performed so we can discuss the results.  Regarding her left wrist she was placed into a well-fitting, well molded fiberglass short arm cast.  She was advised not to get the cast wet.  Post casting x-rays were obtained here in the office today.  She cannot take oral anti-inflammatory medications so she will use Tylenol for breakthrough pain.  She will follow-up in 2 weeks for further evaluation with Dr. Mcdowell.

## 2025-01-03 NOTE — DATA REVIEWED
[Elbow] : elbow [Left] : left [Wrist] : wrist [FreeTextEntry1] : 3 views of the left elbow were obtained here in the office today and show: No obvious fracture but there is a posterior fat pad. [FreeTextEntry2] : 2 views of the left forearm were obtained here in the office today and show: An intra-articular left distal radius fracture. [FreeTextEntry3] : 3 views of the left wrist were obtained in the office today and show: A minimally displaced intra-articular distal radius fracture

## 2025-01-03 NOTE — IMAGING
[de-identified] : Physical exam of the left elbow: There is mild edema noted over the proximal aspect of the extensor surface of the left forearm.  No edema, erythema or ecchymosis noted over the elbow.  She lacks a few degrees full flexion, full extension, full supination and full pronation.  She has pain with supination and pronation.  No tenderness to palpation over the olecranon.  No tenderness to palpation over the medial or lateral epicondyles.  She has tenderness to palpation over the radial head.  The distal biceps is intact.  Physical exam of the left wrist: There is edema noted dorsally.  There is ecchymosis over the dorsal aspect of the wrist just proximal to the thumb, there is also an area of ecchymosis just proximal to the ulnar styloid.  There is also some ecchymosis over the flexor retinaculum.  Decreased and painful range of motion with extension, flexion, ulnar and radial deviation of the left wrist.  There is tenderness to palpation over the distal radius, tenderness to palpation just proximal to the distal ulna.

## 2025-01-21 ENCOUNTER — APPOINTMENT (OUTPATIENT)
Dept: ORTHOPEDIC SURGERY | Facility: CLINIC | Age: 75
End: 2025-01-21
Payer: MEDICARE

## 2025-01-21 DIAGNOSIS — S69.92XA UNSPECIFIED INJURY OF LEFT WRIST, HAND AND FINGER(S), INITIAL ENCOUNTER: ICD-10-CM

## 2025-01-21 DIAGNOSIS — S59.909A UNSPECIFIED INJURY OF UNSPECIFIED ELBOW, INITIAL ENCOUNTER: ICD-10-CM

## 2025-01-21 PROCEDURE — 99204 OFFICE O/P NEW MOD 45 MIN: CPT

## 2025-01-21 PROCEDURE — 73110 X-RAY EXAM OF WRIST: CPT | Mod: LT

## 2025-01-21 NOTE — ASSESSMENT
[FreeTextEntry1] :  The patient is being seen today for a left wrist fx. she is also here for follow-up of her elbow.  She is doing a lot better than prior.  She is in much less pain than initially: she is still in a cast.  left wrist: skin intact after cast removal, tenderness to palpation over distal radius, full range of motion of fingers, neurovascular intact  Left elbow: Minimally tender palpation over radial head, near full range of motion the elbow, neurovascular intact   x-ray left wrist 3 views shows intra-articular distal radius fracture nondisplaced  Status post intra-articular distal radius fracture which is nondisplaced.  We discussed that the fracture is healing as expected. We discussed the option to stay in a cast or switch to a brace that she removes only to bath. She opted for the later, I provided a left wrist cock up brace.  She will follow up in 2 weeks for evaluation with either Erlinda or Hermann.  As for her elbow does not bother her significantly.  She will continue doing what she has been.

## 2025-02-06 ENCOUNTER — APPOINTMENT (OUTPATIENT)
Dept: ORTHOPEDIC SURGERY | Facility: CLINIC | Age: 75
End: 2025-02-06
Payer: MEDICARE

## 2025-02-06 DIAGNOSIS — S69.92XA UNSPECIFIED INJURY OF LEFT WRIST, HAND AND FINGER(S), INITIAL ENCOUNTER: ICD-10-CM

## 2025-02-06 PROCEDURE — 73110 X-RAY EXAM OF WRIST: CPT | Mod: LT

## 2025-02-06 PROCEDURE — 99213 OFFICE O/P EST LOW 20 MIN: CPT

## 2025-02-06 NOTE — ASSESSMENT
[FreeTextEntry1] :  The patient is being seen today for a left wrist fx.  She is doing a lot better than prior.  She is in much less pain than initially: She has been utilizing a brace.  left wrist: skin intact, tenderness to palpation over distal radius, full range of motion of fingers, neurovascular intact     x-ray left wrist 3 views shows intra-articular distal radius fracture nondisplaced  Status post intra-articular distal radius fracture which is nondisplaced.  The patient is doing well overall.  Patient  has been utilizing her cock-up wrist brace that which she can discontinue moving forwards as she has good range of motion, and her pain is well-controlled.  X-rays were discussed in depth and reveal acceptable alignment of fracture pattern with routine healing.  The p atient may return to normal activity within the limitations of pain.  I encouraged activity modification within the limitations of pain.  Risks and benefits were discussed.  Red flag symptoms were discussed. All questions and concerns addressed to patient's satisfaction. Patient expresses full understanding of treatment plan.

## 2025-03-06 ENCOUNTER — APPOINTMENT (OUTPATIENT)
Dept: ORTHOPEDIC SURGERY | Facility: CLINIC | Age: 75
End: 2025-03-06

## 2025-06-04 ENCOUNTER — APPOINTMENT (OUTPATIENT)
Dept: HEART AND VASCULAR | Facility: CLINIC | Age: 75
End: 2025-06-04
Payer: MEDICARE

## 2025-06-04 PROCEDURE — 93321 DOPPLER ECHO F-UP/LMTD STD: CPT

## 2025-06-04 PROCEDURE — 93325 DOPPLER ECHO COLOR FLOW MAPG: CPT

## 2025-06-04 PROCEDURE — 93308 TTE F-UP OR LMTD: CPT

## 2025-06-09 ENCOUNTER — NON-APPOINTMENT (OUTPATIENT)
Age: 75
End: 2025-06-09

## 2025-06-09 ENCOUNTER — APPOINTMENT (OUTPATIENT)
Dept: CARDIOTHORACIC SURGERY | Facility: CLINIC | Age: 75
End: 2025-06-09
Payer: MEDICARE

## 2025-06-09 VITALS
SYSTOLIC BLOOD PRESSURE: 157 MMHG | OXYGEN SATURATION: 93 % | HEART RATE: 50 BPM | WEIGHT: 180 LBS | TEMPERATURE: 96.7 F | DIASTOLIC BLOOD PRESSURE: 71 MMHG | HEIGHT: 64 IN | BODY MASS INDEX: 30.73 KG/M2

## 2025-06-09 PROCEDURE — 99215 OFFICE O/P EST HI 40 MIN: CPT

## 2025-06-12 NOTE — PLAN
[TextEntry] :  (1) Consultation with Dr. Salazar (2) Discuss with Dr. Hercules medication changes to include SGLT-2 inhibitor  (3) Continue cardiology care and medication management with Dr. Hercules

## 2025-06-12 NOTE — PHYSICAL EXAM
[Well Developed] : well developed [Well Nourished] : well nourished [No Acute Distress] : no acute distress [Normal Conjunctiva] : normal conjunctiva [Normal S1, S2] : normal S1, S2 [No Rub] : no rub [No Gallop] : no gallop [Murmur] : murmur [Clear Lung Fields] : clear lung fields [Good Air Entry] : good air entry [No Respiratory Distress] : no respiratory distress  [Soft] : abdomen soft [Non Tender] : non-tender [Normal Bowel Sounds] : normal bowel sounds [Normal Gait] : normal gait [No Edema] : no edema [No Cyanosis] : no cyanosis [No Clubbing] : no clubbing [No Rash] : no rash [No Skin Lesions] : no skin lesions [Edema ___] : edema [unfilled] [de-identified] : supple  [de-identified] : II/VI SM

## 2025-06-12 NOTE — PHYSICAL EXAM
[Well Developed] : well developed [Well Nourished] : well nourished [No Acute Distress] : no acute distress [Normal Conjunctiva] : normal conjunctiva [Normal S1, S2] : normal S1, S2 [No Rub] : no rub [No Gallop] : no gallop [Murmur] : murmur [Clear Lung Fields] : clear lung fields [Good Air Entry] : good air entry [No Respiratory Distress] : no respiratory distress  [Soft] : abdomen soft [Non Tender] : non-tender [Normal Bowel Sounds] : normal bowel sounds [Normal Gait] : normal gait [No Edema] : no edema [No Cyanosis] : no cyanosis [No Clubbing] : no clubbing [No Rash] : no rash [No Skin Lesions] : no skin lesions [Edema ___] : edema [unfilled] [de-identified] : supple  [de-identified] : II/VI SM

## 2025-06-12 NOTE — PHYSICAL EXAM
[Well Developed] : well developed [Well Nourished] : well nourished [No Acute Distress] : no acute distress [Normal Conjunctiva] : normal conjunctiva [Normal S1, S2] : normal S1, S2 [No Rub] : no rub [No Gallop] : no gallop [Murmur] : murmur [Clear Lung Fields] : clear lung fields [Good Air Entry] : good air entry [No Respiratory Distress] : no respiratory distress  [Soft] : abdomen soft [Non Tender] : non-tender [Normal Bowel Sounds] : normal bowel sounds [Normal Gait] : normal gait [No Edema] : no edema [No Cyanosis] : no cyanosis [No Clubbing] : no clubbing [No Rash] : no rash [No Skin Lesions] : no skin lesions [Edema ___] : edema [unfilled] [de-identified] : supple  [de-identified] : II/VI SM

## 2025-06-12 NOTE — ASSESSMENT
[FreeTextEntry1] : 75F, former smoker, with PMH of CVA (7/2012, treated at [facility], no residual), psoriasis, OA, renal mass thought to be a normal variant (saw Dr. Christy Mckeon at [facility]), HTN, HLD, and moderate to severe MR who presents for follow up.  The patient is clinically stable; NYHA Class III.  The ECHO was independently reviewed by Dr. Herrera which now shows moderate to severe mitral regurgitation.  Due to the patient's increased symptoms over the last few months, Dr. Herrera recommends intervention on the mitral valve.  mTEER, medical management, and surgery options discussed with the patient and her daughter via telephone.  Dr. Herrera recommends the patient have a consultation with Dr. Salazar for an interdisciplinary discussion to determine best intervention plan for the patient.  Dr. Herrera will discuss with Dr. Hercules any medication management that can be offered at this time.  The plan was discussed with the patient and her daughter.  All questions answered.

## 2025-06-12 NOTE — HISTORY OF PRESENT ILLNESS
[FreeTextEntry1] : Referred by Dr. Hercules  75F, former smoker, with PMH of CVA (7/2012, treated at [facility], no residual), psoriasis, OA, renal mass thought to be a normal variant (saw Dr. Christy Mckeon at [facility]), HTN, HLD, and moderate to severe MR who presents for follow up.   Denies prior MI/CAD, pulm/liver/heme disease. No prior transfusion.   AARON on 12/4/2024-  1. Normal left and right ventricular size and systolic function.2. Moderate mitral regurgitation.3. The etiology of mitral regurgitation appears to be atrial-functional.4. Aortic sclerosis without significant stenosis. 5. There is moderate plaque seen in the visualized portion of the descending aorta. There is severe non-mobile plaque seen in the visualized portion of the aortic arch. 6. No pericardial effusion.  Last seen in [facility] 12/2024- Echocardiogram showed normal BiV function, 3+MR- atrial functional, 3+TR, normal LV size and function, and moderate PHTN. Per Dr. Goldberg's AARON review, patient with moderate atrial functional MR. Results were discussed with patient and her daughter. Indications to treat MR were discussed. Given, patient does not have severe MR, Dr. Herrera recommends close clinical monitoring at this time. Patient advised to reach out to our [facility] should she develop worsening HF symptoms, which will require her to be evaluate sooner rather than later. The patient will otherwise return to [facility] clinic in 6 months with a TTE.  Since her last visit, the patient states she is experiencing more shortness of breath for the last few months.  The patient has also noted an increase in her ankle swelling and need to take Lasix more often.  She denies chest pain, shortness of breath at rest, dizziness, syncope, orthopnea, or PND.  She states she has fluttering of her heart that lasts seconds a few times a week along with chest discomfort that lasts for seconds at rest.  The patient is under great stress taking care of her 100 year old mother.    ECHO on 6/4/2025 showed EF 55-60%, moderate to severe mitral regurgitation, and moderate tricuspid regurgitation

## 2025-06-12 NOTE — HISTORY OF PRESENT ILLNESS
[FreeTextEntry1] : Referred by Dr. Hercules  75F, former smoker, with PMH of CVA (7/2012, treated at [facility], no residual), psoriasis, OA, renal mass thought to be a normal variant (saw Dr. Christy Mckeon at [facility]), HTN, HLD, and moderate to severe MR who presents for follow up.   Denies prior MI/CAD, pulm/liver/heme disease. No prior transfusion.   AARON on 12/4/2024-  1. Normal left and right ventricular size and systolic function.2. Moderate mitral regurgitation.3. The etiology of mitral regurgitation appears to be atrial-functional.4. Aortic sclerosis without significant stenosis. 5. There is moderate plaque seen in the visualized portion of the descending aorta. There is severe non-mobile plaque seen in the visualized portion of the aortic arch. 6. No pericardial effusion.  Last seen in [facility] 12/2024- Echocardiogram showed normal BiV function, 3+MR- atrial functional, 3+TR, normal LV size and function, and moderate PHTN. Per Dr. Goldberg's AARON review, patient with moderate atrial functional MR. Results were discussed with patient and her daughter. Indications to treat MR were discussed. Given, patient does not have severe MR, Dr. Herrera recommends close clinical monitoring at this time. Patient advised to reach out to our [facility] should she develop worsening HF symptoms, which will require her to be evaluate sooner rather than later. The patient will otherwise return to [facility] clinic in 6 months with a TTE.  Since her last visit, the patient states she is experiencing more shortness of breath for the last few months.  The patient has also noted an increase in her ankle swelling and need to take Lasix more often.  She denies chest pain, shortness of breath at rest, dizziness, syncope, orthopnea, or PND.  She states she has fluttering of her heart that lasts seconds a few times a week along with chest discomfort that lasts for seconds at rest.  The patient is under great stress taking care of her 100 year old mother.    ECHO on 6/4/2025 showed EF 55-60%, moderate to severe mitral regurgitation, and moderate tricuspid regurgitation

## 2025-06-12 NOTE — END OF VISIT
[FreeTextEntry3] : I, Oma Álvarez, am scribing for Dr. Eleazar Herrera the following sections: HISTORY OF PRESENT ILLNESS, PAST MEDICAL/FAMILY/SOCIAL HISTORY, REVIEW OF SYSTEMS, VITAL SIGNS, PHYSICAL EXAM AND DISPOSITION.    I personally performed the services described in the documentation and reviewed the documented recorded by the scribe in my presence; it accurately and completely records my words and actions.

## 2025-06-12 NOTE — HISTORY OF PRESENT ILLNESS
[FreeTextEntry1] : Referred by Dr. Hercules  75F, former smoker, with PMH of CVA (7/2012, treated at [facility], no residual), psoriasis, OA, renal mass thought to be a normal variant (saw Dr. Christy Mckeon at [facility]), HTN, HLD, and moderate to severe MR who presents for follow up.   Denies prior MI/CAD, pulm/liver/heme disease. No prior transfusion.   AARON on 12/4/2024-  1. Normal left and right ventricular size and systolic function.2. Moderate mitral regurgitation.3. The etiology of mitral regurgitation appears to be atrial-functional.4. Aortic sclerosis without significant stenosis. 5. There is moderate plaque seen in the visualized portion of the descending aorta. There is severe non-mobile plaque seen in the visualized portion of the aortic arch. 6. No pericardial effusion.  Last seen in [facility] 12/2024- Echocardiogram showed normal BiV function, 3+MR- atrial functional, 3+TR, normal LV size and function, and moderate PHTN. Per Dr. Goldberg's AARON review, patient with moderate atrial functional MR. Results were discussed with patient and her daughter. Indications to treat MR were discussed. Given, patient does not have severe MR, Dr. Herrera recommends close clinical monitoring at this time. Patient advised to reach out to our [facility] should she develop worsening HF symptoms, which will require her to be evaluate sooner rather than later. The patient will otherwise return to [facility] clinic in 6 months with a TTE.  Since her last visit, the patient states she is experiencing more shortness of breath for the last few months.  The patient has also noted an increase in her ankle swelling and need to take Lasix more often.  She denies chest pain, shortness of breath at rest, dizziness, syncope, orthopnea, or PND.  She states she has fluttering of her heart that lasts seconds a few times a week along with chest discomfort that lasts for seconds at rest.  The patient is under great stress taking care of her 100 year old mother.    ECHO on 6/4/2025 showed EF 55-60%, moderate to severe mitral regurgitation, and moderate tricuspid regurgitation

## 2025-06-12 NOTE — ASSESSMENT
[FreeTextEntry1] : 75F, former smoker, with PMH of CVA (7/2012, treated at [facility], no residual), psoriasis, OA, renal mass thought to be a normal variant (saw Dr. Christy Mckeon at [facility]), HTN, HLD, and moderate to severe MR who presents for follow up.  The patient is clinically stable; NYHA Class III.  The ECHO was independently reviewed by Dr. Herrera which now shows moderate to severe mitral regurgitation.  Due to the patient's increased symptoms over the last few months, Dr. Herrera recommends intervention on the mitral valve.  mTEER, medical management, and surgery options discussed with the patient and her daughter via telephone.  Dr. Herrera recommends the patient have a consultation with Dr. Salazar for an interdisciplinary discussion to determine best intervention plan for the patient.  Dr. Herrera will discuss with Dr. Hercules any medication management that can be offered at this time.  The plan was discussed with the patient and her daughter.  All questions answered.

## 2025-06-12 NOTE — REVIEW OF SYSTEMS
[Joint Pain] : joint pain [Under Stress] : under stress [Feeling Fatigued] : feeling fatigued [Dyspnea on exertion] : dyspnea during exertion [Chest Discomfort] : chest discomfort [Lower Ext Edema] : lower extremity edema [Palpitations] : palpitations [Confusion] : no confusion was observed [Negative] : Musculoskeletal [FreeTextEntry5] : see HPI

## 2025-06-19 PROBLEM — S59.909A ELBOW INJURY, INITIAL ENCOUNTER: Status: RESOLVED | Noted: 2025-01-03 | Resolved: 2025-06-19

## 2025-06-19 PROBLEM — S52.572A OTHER CLOSED INTRA-ARTICULAR FRACTURE OF DISTAL END OF LEFT RADIUS, INITIAL ENCOUNTER: Status: RESOLVED | Noted: 2025-01-03 | Resolved: 2025-06-19

## 2025-06-19 PROBLEM — Z86.39 HISTORY OF HYPERLIPIDEMIA: Status: RESOLVED | Noted: 2025-06-19 | Resolved: 2025-06-19

## 2025-06-19 PROBLEM — I67.89 OTHER CEREBROVASCULAR DISEASE: Status: RESOLVED | Noted: 2018-07-13 | Resolved: 2025-06-19

## 2025-06-19 PROBLEM — Z86.79 HISTORY OF ATHEROSCLEROSIS: Status: RESOLVED | Noted: 2018-07-13 | Resolved: 2025-06-19

## 2025-06-19 PROBLEM — I87.2 VENOUS INSUFFICIENCY OF LEG: Status: RESOLVED | Noted: 2025-06-19 | Resolved: 2025-06-19

## 2025-06-19 PROBLEM — Z86.73 HISTORY OF CVA (CEREBROVASCULAR ACCIDENT): Status: RESOLVED | Noted: 2025-06-19 | Resolved: 2025-06-19

## 2025-06-19 PROBLEM — S59.912A FOREARM INJURY, LEFT, INITIAL ENCOUNTER: Status: RESOLVED | Noted: 2025-01-03 | Resolved: 2025-06-19

## 2025-06-19 PROBLEM — N28.89 RENAL MASS: Status: RESOLVED | Noted: 2025-06-19 | Resolved: 2025-06-19

## 2025-06-19 PROBLEM — M15.9 GENERALIZED OSTEOARTHRITIS: Status: RESOLVED | Noted: 2025-06-19 | Resolved: 2025-06-19

## 2025-06-19 PROBLEM — S69.92XA INJURY, WRIST, LEFT, INITIAL ENCOUNTER: Status: RESOLVED | Noted: 2025-01-03 | Resolved: 2025-06-19

## 2025-06-19 PROBLEM — I87.2 VENOUS INSUFFICIENCY: Status: RESOLVED | Noted: 2018-07-13 | Resolved: 2025-06-19

## 2025-06-19 PROBLEM — S62.102S LEFT WRIST FRACTURE, SEQUELA: Status: RESOLVED | Noted: 2025-06-19 | Resolved: 2025-06-19

## 2025-06-19 PROBLEM — Z86.79 H/O: HTN (HYPERTENSION): Status: RESOLVED | Noted: 2025-06-19 | Resolved: 2025-06-19

## 2025-06-19 PROBLEM — Z86.79 HISTORY OF ESSENTIAL HYPERTENSION: Status: RESOLVED | Noted: 2018-07-13 | Resolved: 2025-06-19

## 2025-06-19 PROBLEM — Z86.79 HISTORY OF MITRAL VALVE INSUFFICIENCY: Status: RESOLVED | Noted: 2018-07-13 | Resolved: 2025-06-19

## 2025-06-24 ENCOUNTER — APPOINTMENT (OUTPATIENT)
Dept: CARDIOTHORACIC SURGERY | Facility: CLINIC | Age: 75
End: 2025-06-24
Payer: MEDICARE

## 2025-06-24 VITALS
DIASTOLIC BLOOD PRESSURE: 71 MMHG | HEIGHT: 64 IN | OXYGEN SATURATION: 95 % | SYSTOLIC BLOOD PRESSURE: 157 MMHG | HEART RATE: 50 BPM | WEIGHT: 180 LBS | BODY MASS INDEX: 30.73 KG/M2 | TEMPERATURE: 97 F | RESPIRATION RATE: 18 BRPM

## 2025-06-24 PROCEDURE — 99205 OFFICE O/P NEW HI 60 MIN: CPT

## 2025-06-25 PROBLEM — I07.1 MODERATE TRICUSPID REGURGITATION: Status: ACTIVE | Noted: 2025-06-19

## 2025-06-25 PROBLEM — I34.0: Status: ACTIVE | Noted: 2025-06-25

## 2025-06-25 PROBLEM — I34.0 SEVERE MITRAL VALVE REGURGITATION: Status: ACTIVE | Noted: 2025-06-19

## 2025-06-25 NOTE — REVIEW OF SYSTEMS
[Feeling Tired] : feeling tired [Chest Pain] : chest pain [Palpitations] : palpitations [Lower Ext Edema] : lower extremity edema [Shortness Of Breath] : shortness of breath [SOB on Exertion] : shortness of breath during exertion [Negative] : Heme/Lymph

## 2025-06-26 NOTE — END OF VISIT
[Time Spent: ___ minutes] : I have spent [unfilled] minutes of time on the encounter which excludes teaching and separately reported services. [FreeTextEntry3] :   I, Dr. LUO [facility], personally performed the evaluation and management (E/M) services for this new patient.  That E/M includes conducting the clinically appropriate initial history &/or exam, assessing all conditions, and establishing the plan of care.  Today, my SELAM, was here to observe &/or participate in the visit & follow plan of care established by me.

## 2025-06-26 NOTE — PHYSICAL EXAM
[General Appearance - Alert] : alert [General Appearance - In No Acute Distress] : in no acute distress [Sclera] : the sclera and conjunctiva were normal [PERRL With Normal Accommodation] : pupils were equal in size, round, and reactive to light [Extraocular Movements] : extraocular movements were intact [Outer Ear] : the ears and nose were normal in appearance [Oropharynx] : the oropharynx was normal [Neck Appearance] : the appearance of the neck was normal [Neck Cervical Mass (___cm)] : no neck mass was observed [Jugular Venous Distention Increased] : there was no jugular-venous distention [Thyroid Diffuse Enlargement] : the thyroid was not enlarged [Thyroid Nodule] : there were no palpable thyroid nodules [Auscultation Breath Sounds / Voice Sounds] : lungs were clear to auscultation bilaterally [Examination Of The Chest] : the chest was normal in appearance [Chest Visual Inspection Thoracic Asymmetry] : no chest asymmetry [Diminished Respiratory Excursion] : normal chest expansion [2+] : left 2+ [No Abnormalities] : the abdominal aorta was not enlarged and no bruit was heard [Bowel Sounds] : normal bowel sounds [Abdomen Soft] : soft [Abdomen Tenderness] : non-tender [Abdomen Mass (___ Cm)] : no abdominal mass palpated [No CVA Tenderness] : no ~M costovertebral angle tenderness [No Spinal Tenderness] : no spinal tenderness [Abnormal Walk] : normal gait [Nail Clubbing] : no clubbing  or cyanosis of the fingernails [Musculoskeletal - Swelling] : no joint swelling seen [Motor Tone] : muscle strength and tone were normal [Skin Color & Pigmentation] : normal skin color and pigmentation [Skin Turgor] : normal skin turgor [] : no rash [Deep Tendon Reflexes (DTR)] : deep tendon reflexes were 2+ and symmetric [Sensation] : the sensory exam was normal to light touch and pinprick [No Focal Deficits] : no focal deficits [Oriented To Time, Place, And Person] : oriented to person, place, and time [Impaired Insight] : insight and judgment were intact [Affect] : the affect was normal [Right Carotid Bruit] : no bruit heard over the right carotid [Left Carotid Bruit] : no bruit heard over the left carotid [Right Femoral Bruit] : no bruit heard over the right femoral artery [Left Femoral Bruit] : no bruit heard over the left femoral artery [FreeTextEntry1] : no pedal edema noted

## 2025-06-26 NOTE — ASSESSMENT
[FreeTextEntry1] : 74 yo female, former smoker, with PMH of HTN, HLD, HFpEF, grade 1 diastolic dysfunction, CVA (7/2012, treated at [facility] [place], no residual), psoriasis, OA and right kidney cysts presents with moderate to severe functional mitral regurgitation, moderate tricuspid regurgitation and moderate pulmonary HTN.  Dr. Salazar reviewed the echocardiogram images and reports with the patient and her daughter and discussed the case with Dr. Goldberg and Dr. Hercules. Dr. Salazar performed the STS risk calculator and quoted a 2-3% operative mortality and complication risk and discussed the STS risk factors with the patient including but not limited to death, heart attack, bleeding, stroke, kidney problems and infection. Dr. Salazar also discussed surgical approaches, minimally invasive versus sternotomy.  Dr. Salazar discussed the need for cardiac catherization prior to surgery to evaluate for CAD, if she has no/non obstructive CAD she would be candidate for robotic, minimally invasive approach. Dr. Salazar also discussed that she is clinically stable at this time and there is no urgent need for surgical intervention. He recommends that she follow up with Dr. Hercules to discuss GDMT. She will return in 2-3 months to for heart valve team evaluation to discuss plan of care. All questions were addressed.   Plan: follow up with Dr. Hercules  follow up with SHLUR[facility] and Dr. Herrera for possible catheter-based procedures.  If surgery is required would schedule LHC via radial 1 day prior to determine surgical approach MV repair/possible replacment w/bioprosthesis, and TV repair

## 2025-06-26 NOTE — HISTORY OF PRESENT ILLNESS
[FreeTextEntry1] : 76 yo female, former smoker, with PMH of HTN, HLD, HFpEF, grade 1 diastolic dysfunction, CVA (7/2012, treated at [facility], no residual), psoriasis, OA and right kidney cysts thought to be a normal variant (saw Dr. Christy Mckeon at [facility]) presents with moderate to severe functional mitral regurgitation, moderate tricuspid regurgitation and moderate pulmonary HTN.  Patient followed by cardiologist, Dr. Hercules. She was referred to Dr. Herrera and D in December 2024.  She was recently seen by Dr. Herrera and noted to have increased shortness of breath, palpitations, worsening lower extremity edema requiring her to take lasix and prn chest discomfort. NYHA 3. She reports being under a great deal of stress caring for her 100 yo mother.  TTE on 6/4/24 revealed severe mitral regurgitation, moderate tricuspid regurgitation, moderate pulmonary htn, LVEF 55-60%.   Patient presents today for surgical consult with Dr. Salazar as part of heart valve team approach. She is accompanied by her daughter. She appears generally well, NAD. She reports palpitations, left chest "pressure", SOB/SHEA, decreased exercise tolerance over past year. She reports being under a great deal of stress with caring for her 100 year old mother.

## 2025-06-26 NOTE — ASSESSMENT
[FreeTextEntry1] : 74 yo female, former smoker, with PMH of HTN, HLD, HFpEF, grade 1 diastolic dysfunction, CVA (7/2012, treated at [facility], no residual), psoriasis, OA and right kidney cysts presents with moderate to severe functional mitral regurgitation, moderate tricuspid regurgitation and moderate pulmonary HTN.  Dr. Salazar reviewed the echocardiogram images and reports with the patient and her daughter and discussed the case with Dr. Goldberg and Dr. Hercules. Dr. Salazar performed the STS risk calculator and quoted a 2-3% operative mortality and complication risk and discussed the STS risk factors with the patient including but not limited to death, heart attack, bleeding, stroke, kidney problems and infection. Dr. Salazar also discussed surgical approaches, minimally invasive versus sternotomy.  Dr. Salazar discussed the need for cardiac catherization prior to surgery to evaluate for CAD, if she has no/non obstructive CAD she would be candidate for robotic, minimally invasive approach. Dr. Salazar also discussed that she is clinically stable at this time and there is no urgent need for surgical intervention. He recommends that she follow up with Dr. Hercules to discuss GDMT. She will return in 2-3 months to for heart valve team evaluation to discuss plan of care. All questions were addressed.   Plan: follow up with Dr. Hercules  follow up with SHL[facility] and Dr. Herrera for possible catheter-based procedures.  If surgery is required would schedule LHC via radial 1 day prior to determine surgical approach MV repair/possible replacment w/bioprosthesis, and TV repair

## 2025-06-26 NOTE — ASSESSMENT
[FreeTextEntry1] : 74 yo female, former smoker, with PMH of HTN, HLD, HFpEF, grade 1 diastolic dysfunction, CVA (7/2012, treated at [facility], no residual), psoriasis, OA and right kidney cysts presents with moderate to severe functional mitral regurgitation, moderate tricuspid regurgitation and moderate pulmonary HTN.  Dr. Salazar reviewed the echocardiogram images and reports with the patient and her daughter and discussed the case with Dr. Goldberg and Dr. Hercules. Dr. Salazar performed the STS risk calculator and quoted a 2-3% operative mortality and complication risk and discussed the STS risk factors with the patient including but not limited to death, heart attack, bleeding, stroke, kidney problems and infection. Dr. Salazar also discussed surgical approaches, minimally invasive versus sternotomy.  Dr. Salazar discussed the need for cardiac catherization prior to surgery to evaluate for CAD, if she has no/non obstructive CAD she would be candidate for robotic, minimally invasive approach. Dr. Salazar also discussed that she is clinically stable at this time and there is no urgent need for surgical intervention. He recommends that she follow up with Dr. Hercules to discuss GDMT. She will return in 2-3 months to for heart valve team evaluation to discuss plan of care. All questions were addressed.   Plan: follow up with Dr. Hercules  follow up with SH[facility] and Dr. Herrera for possible catheter-based procedures.  If surgery is required would schedule LHC via radial 1 day prior to determine surgical approach MV repair/possible replacment w/bioprosthesis, and TV repair

## 2025-06-26 NOTE — HISTORY OF PRESENT ILLNESS
[FreeTextEntry1] : 74 yo female, former smoker, with PMH of HTN, HLD, HFpEF, grade 1 diastolic dysfunction, CVA (7/2012, treated at [facility], no residual), psoriasis, OA and right kidney cysts thought to be a normal variant (saw Dr. Christy Mckeon at [facility]) presents with moderate to severe functional mitral regurgitation, moderate tricuspid regurgitation and moderate pulmonary HTN.  Patient followed by cardiologist, Dr. Hercules. She was referred to Dr. Herrera and D in December 2024.  She was recently seen by Dr. Herrera and noted to have increased shortness of breath, palpitations, worsening lower extremity edema requiring her to take lasix and prn chest discomfort. NYHA 3. She reports being under a great deal of stress caring for her 100 yo mother.  TTE on 6/4/24 revealed severe mitral regurgitation, moderate tricuspid regurgitation, moderate pulmonary htn, LVEF 55-60%.   Patient presents today for surgical consult with Dr. Salazar as part of heart valve team approach. She is accompanied by her daughter. She appears generally well, NAD. She reports palpitations, left chest "pressure", SOB/SHEA, decreased exercise tolerance over past year. She reports being under a great deal of stress with caring for her 100 year old mother.

## 2025-06-26 NOTE — HISTORY OF PRESENT ILLNESS
[FreeTextEntry1] : 74 yo female, former smoker, with PMH of HTN, HLD, HFpEF, grade 1 diastolic dysfunction, CVA (7/2012, treated at [facility], no residual), psoriasis, OA and right kidney cysts thought to be a normal variant (saw Dr. Christy Mckeon at [facility]) presents with moderate to severe functional mitral regurgitation, moderate tricuspid regurgitation and moderate pulmonary HTN.  Patient followed by cardiologist, Dr. Hercules. She was referred to Dr. Herrera and D in December 2024.  She was recently seen by Dr. Herrera and noted to have increased shortness of breath, palpitations, worsening lower extremity edema requiring her to take lasix and prn chest discomfort. NYHA 3. She reports being under a great deal of stress caring for her 100 yo mother.  TTE on 6/4/24 revealed severe mitral regurgitation, moderate tricuspid regurgitation, moderate pulmonary htn, LVEF 55-60%.   Patient presents today for surgical consult with Dr. Salazar as part of heart valve team approach. She is accompanied by her daughter. She appears generally well, NAD. She reports palpitations, left chest "pressure", SOB/SHEA, decreased exercise tolerance over past year. She reports being under a great deal of stress with caring for her 100 year old mother.

## 2025-06-26 NOTE — END OF VISIT
[Time Spent: ___ minutes] : I have spent [unfilled] minutes of time on the encounter which excludes teaching and separately reported services. [FreeTextEntry3] :   I, Dr. LUO [facility], personally performed the evaluation and management (E/M) services for this new patient.  That E/M includes conducting the clinically appropriate initial history &/or exam, assessing all conditions, and establishing the plan of care.  Today, my SELAM, was here to observe &/or participate in the visit & follow plan of care established by me.

## 2025-06-26 NOTE — DATA REVIEWED
[FreeTextEntry1] : 6/4/25 TTE: 1. Left ventricular systolic function is normal with an ejection fraction visually estimated at 55 to 60 %. 2. There is mild (grade 1) left ventricular diastolic dysfunction. 3. Normal right ventricular cavity size, with normal wall thickness, and normal right ventricular systolic function. 4. Left atrium is severely dilated. 5. Moderate to severe mitral regurgitation. The mitral regurgitant jet is posteriorly directed. 6. Normal left and right atrial size. 7. No significant valvular disease. 8. . 9. Moderate pulmonary hypertension. 10. Moderate tricuspid regurgitation.  12/16/24 AARON: 1. Normal left and right ventricular size and systolic function. 2. Moderate mitral regurgitation. 3. The etiology of mitral regurgitation appears to be atrial-functional. 4. Aortic sclerosis without significant stenosis. 5. There is moderate plaque seen in the visualized portion of the descending aorta. There is severe non-mobile plaque seen in the visualized portion of the aortic arch. 6. No pericardial effusion.  TTE 11/18/24 CONCLUSIONS: 1. Left ventricular systolic function is normal with an ejection fraction visually estimated at 55 to 60 %. 2. Normal right ventricular cavity size, with normal wall thickness, and normal right ventricular systolic function. 3. Left atrium is severely dilated. 4. Moderate to severe mitral regurgitation. The mitral regurgitant jet is posteriorly directed. 5. There has been progression of Pulmonary hypertension and progression of MITRAL REGURGITATION. 6. Mild left ventricular hypertrophy. 7. Moderate pulmonary hypertension. 8. Moderate tricuspid regurgitation. 9. The inferior vena cava is normal in size (normal <2.1cm) with normal inspiratory collapse (normal >50%) consistent with normal right atrial pressure (R 3, range 0-5mmHg).  Renal Mass: - MRI 12/2022 with kidney with lobular contour which is a normal variant and no mass. Right kidney with simple cysts; although it has grown in size it is simple and therefore per guidelines does not require further f/u given very low malignancy potential. Possible left renal cyst noted on MRI 12/2022 not seen on US 11/2024 so would not continue surveillance imaging there.

## 2025-06-26 NOTE — END OF VISIT
[Time Spent: ___ minutes] : I have spent [unfilled] minutes of time on the encounter which excludes teaching and separately reported services. [FreeTextEntry3] :   I, Dr. LUO [facility], personally performed the evaluation and management (E/M) services for this new patient.  That E/M includes conducting the clinically appropriate initial history &/or exam, assessing all conditions, and establishing the plan of care.  Today, my SELAM, was here to observe &/or participate in the visit & follow plan of care established by me.

## 2025-06-27 ENCOUNTER — NON-APPOINTMENT (OUTPATIENT)
Age: 75
End: 2025-06-27

## 2025-06-30 ENCOUNTER — APPOINTMENT (OUTPATIENT)
Dept: HEART AND VASCULAR | Facility: CLINIC | Age: 75
End: 2025-06-30
Payer: MEDICARE

## 2025-06-30 ENCOUNTER — NON-APPOINTMENT (OUTPATIENT)
Age: 75
End: 2025-06-30

## 2025-06-30 VITALS
BODY MASS INDEX: 32.44 KG/M2 | DIASTOLIC BLOOD PRESSURE: 75 MMHG | RESPIRATION RATE: 18 BRPM | HEART RATE: 49 BPM | WEIGHT: 190 LBS | HEIGHT: 64 IN | SYSTOLIC BLOOD PRESSURE: 150 MMHG

## 2025-06-30 PROBLEM — I27.20 MODERATE PULMONARY HYPERTENSION: Status: ACTIVE | Noted: 2025-06-19

## 2025-06-30 PROCEDURE — G2211 COMPLEX E/M VISIT ADD ON: CPT

## 2025-06-30 PROCEDURE — 93000 ELECTROCARDIOGRAM COMPLETE: CPT

## 2025-06-30 PROCEDURE — 99215 OFFICE O/P EST HI 40 MIN: CPT

## 2025-06-30 RX ORDER — EMPAGLIFLOZIN 10 MG/1
10 TABLET, FILM COATED ORAL DAILY
Qty: 90 | Refills: 3 | Status: ACTIVE | COMMUNITY
Start: 2025-06-30

## 2025-06-30 RX ORDER — SACUBITRIL AND VALSARTAN 24; 26 MG/1; MG/1
24-26 TABLET, FILM COATED ORAL
Qty: 60 | Refills: 4 | Status: ACTIVE | COMMUNITY
Start: 2025-06-30 | End: 1900-01-01

## 2025-07-10 LAB
ANION GAP SERPL CALC-SCNC: 13 MMOL/L
BUN SERPL-MCNC: 22 MG/DL
CALCIUM SERPL-MCNC: 9.9 MG/DL
CHLORIDE SERPL-SCNC: 97 MMOL/L
CO2 SERPL-SCNC: 24 MMOL/L
CREAT SERPL-MCNC: 1.02 MG/DL
EGFRCR SERPLBLD CKD-EPI 2021: 57 ML/MIN/1.73M2
GLUCOSE SERPL-MCNC: 99 MG/DL
NT-PROBNP SERPL-MCNC: 1025 PG/ML
POTASSIUM SERPL-SCNC: 4.6 MMOL/L
SODIUM SERPL-SCNC: 134 MMOL/L

## 2025-08-18 ENCOUNTER — APPOINTMENT (OUTPATIENT)
Dept: HEART AND VASCULAR | Facility: CLINIC | Age: 75
End: 2025-08-18
Payer: MEDICARE

## 2025-08-18 ENCOUNTER — APPOINTMENT (OUTPATIENT)
Dept: HEART AND VASCULAR | Facility: CLINIC | Age: 75
End: 2025-08-18

## 2025-08-18 VITALS
HEART RATE: 56 BPM | WEIGHT: 191 LBS | SYSTOLIC BLOOD PRESSURE: 153 MMHG | DIASTOLIC BLOOD PRESSURE: 80 MMHG | BODY MASS INDEX: 32.61 KG/M2 | HEIGHT: 64 IN

## 2025-08-18 DIAGNOSIS — R09.89 OTHER SPECIFIED SYMPTOMS AND SIGNS INVOLVING THE CIRCULATORY AND RESPIRATORY SYSTEMS: ICD-10-CM

## 2025-08-18 DIAGNOSIS — I50.32 CHRONIC DIASTOLIC (CONGESTIVE) HEART FAILURE: ICD-10-CM

## 2025-08-18 PROCEDURE — 99214 OFFICE O/P EST MOD 30 MIN: CPT

## 2025-08-18 PROCEDURE — 93000 ELECTROCARDIOGRAM COMPLETE: CPT

## 2025-08-18 PROCEDURE — G2211 COMPLEX E/M VISIT ADD ON: CPT

## 2025-08-19 LAB
ANION GAP SERPL CALC-SCNC: 13 MMOL/L
BUN SERPL-MCNC: 19 MG/DL
CALCIUM SERPL-MCNC: 9.4 MG/DL
CHLORIDE SERPL-SCNC: 100 MMOL/L
CO2 SERPL-SCNC: 23 MMOL/L
CREAT SERPL-MCNC: 0.99 MG/DL
EGFRCR SERPLBLD CKD-EPI 2021: 59 ML/MIN/1.73M2
GLUCOSE SERPL-MCNC: 88 MG/DL
NT-PROBNP SERPL-MCNC: 695 PG/ML
POTASSIUM SERPL-SCNC: 4.7 MMOL/L
SODIUM SERPL-SCNC: 135 MMOL/L

## 2025-09-15 ENCOUNTER — APPOINTMENT (OUTPATIENT)
Dept: HEART AND VASCULAR | Facility: CLINIC | Age: 75
End: 2025-09-15
Payer: MEDICARE

## 2025-09-15 PROCEDURE — 93321 DOPPLER ECHO F-UP/LMTD STD: CPT

## 2025-09-15 PROCEDURE — 93308 TTE F-UP OR LMTD: CPT

## 2025-09-15 PROCEDURE — 93325 DOPPLER ECHO COLOR FLOW MAPG: CPT
